# Patient Record
Sex: FEMALE
[De-identification: names, ages, dates, MRNs, and addresses within clinical notes are randomized per-mention and may not be internally consistent; named-entity substitution may affect disease eponyms.]

---

## 2021-07-15 ENCOUNTER — HOSPITAL ENCOUNTER (INPATIENT)
Dept: HOSPITAL 75 - ICU | Age: 81
LOS: 3 days | Discharge: HOME | DRG: 378 | End: 2021-07-18
Attending: INTERNAL MEDICINE | Admitting: INTERNAL MEDICINE
Payer: MEDICARE

## 2021-07-15 VITALS — WEIGHT: 156.75 LBS | HEIGHT: 65.75 IN | BODY MASS INDEX: 25.49 KG/M2

## 2021-07-15 DIAGNOSIS — Z79.01: ICD-10-CM

## 2021-07-15 DIAGNOSIS — J44.9: ICD-10-CM

## 2021-07-15 DIAGNOSIS — Z88.2: ICD-10-CM

## 2021-07-15 DIAGNOSIS — Z88.0: ICD-10-CM

## 2021-07-15 DIAGNOSIS — Z20.822: ICD-10-CM

## 2021-07-15 DIAGNOSIS — K92.1: Primary | ICD-10-CM

## 2021-07-15 DIAGNOSIS — K29.70: ICD-10-CM

## 2021-07-15 DIAGNOSIS — H54.7: ICD-10-CM

## 2021-07-15 DIAGNOSIS — I48.21: ICD-10-CM

## 2021-07-15 DIAGNOSIS — K44.9: ICD-10-CM

## 2021-07-15 DIAGNOSIS — K21.9: ICD-10-CM

## 2021-07-15 DIAGNOSIS — I10: ICD-10-CM

## 2021-07-15 PROCEDURE — 84100 ASSAY OF PHOSPHORUS: CPT

## 2021-07-15 PROCEDURE — 74177 CT ABD & PELVIS W/CONTRAST: CPT

## 2021-07-15 PROCEDURE — 87636 SARSCOV2 & INF A&B AMP PRB: CPT

## 2021-07-15 PROCEDURE — 87081 CULTURE SCREEN ONLY: CPT

## 2021-07-15 PROCEDURE — 85027 COMPLETE CBC AUTOMATED: CPT

## 2021-07-15 PROCEDURE — 36415 COLL VENOUS BLD VENIPUNCTURE: CPT

## 2021-07-15 PROCEDURE — 83735 ASSAY OF MAGNESIUM: CPT

## 2021-07-15 PROCEDURE — 94760 N-INVAS EAR/PLS OXIMETRY 1: CPT

## 2021-07-15 PROCEDURE — 94640 AIRWAY INHALATION TREATMENT: CPT

## 2021-07-15 PROCEDURE — 80076 HEPATIC FUNCTION PANEL: CPT

## 2021-07-15 PROCEDURE — 80048 BASIC METABOLIC PNL TOTAL CA: CPT

## 2021-07-15 PROCEDURE — 85025 COMPLETE CBC W/AUTO DIFF WBC: CPT

## 2021-07-15 RX ADMIN — SODIUM CHLORIDE SCH MLS/HR: 900 INJECTION, SOLUTION INTRAVENOUS at 23:50

## 2021-07-15 NOTE — TELE-ICU CONSULT
History of Present Illness


History of Present Illness


Date Seen by Provider:  Jul 15, 2021


Time Seen by Provider:  23:14


Date of Admission


80 yo F admitted from OSH. Has weakness, LGI bleed, BRB, taking Eliquis for a 

fib, Has COPD, surgery to see in am, Hb was 14, no hypotension


WBC 15k Hx of GI bleed 18 years ago, 


Hx of bleeding ulcer, needed 4U of blood


No associated pain but has been having diarrhea lately





Allergies and Home Medications


Allergies


Coded Allergies:  


     Penicillins (Verified  Allergy, Unknown, 7/15/21)


     Sulfa (Sulfonamide Antibiotics) (Verified  Allergy, Unknown, 7/15/21)


     Tetanus Vaccines and Toxoid (Verified  Allergy, Unknown, 7/15/21)


     acetaminophen (Verified  Allergy, Unknown, 7/15/21)


     cephalexin (Verified  Allergy, Unknown, 7/15/21)


     ibuprofen (Verified  Allergy, Unknown, 7/15/21)


     warfarin (Verified  Allergy, Unknown, 7/15/21)





Past Medical/Social/Family Hx


Patient Social History


Tobacco Use?:  No


Smoking Status:  Never a Smoker


Smokeless Tobacco Frequency:  Never a User


Use of E-Cig and/or Vaping dev:  No


Substance use?:  No


Alcohol Use?:  No


Pt stated abuse/neglect:  No





Immunizations Up To Date


Influenza Vaccine Up-to-Date:  No; Not Current





Current Status


Pregnancy status:  No


Advance Directives:  No


Communicates:  Verbally


Primary Language:  English


Preferred Spoken Language:  English


Is interpretation needed?:  No


Sensory deficits:  Vision impairment


Implanted or Applied Medical D:  Orthopedic hardware





Review of Systems


Gastrointestinal:  diarrhea





Sepsis Event


Evaluation


Height, Weight, BMI


Height: '"


Weight: lbs. oz. kg; 25.09 BMI


Method:





Exam


Exam


Patient acknowledged, consented, and participated in this virtual visit which 

was conducted using real time audio/video


Height & Weight


Height: '"


Weight: lbs. oz. kg; 25.09 BMI


Method:


Cardiovascular:  Regular Rate, Rhythm


Gastrointestinal:  normal bowel sounds, non tender, soft


Neurologic/Psychiatric:  Alert, Oriented x3





Assessment/Plan


Assessment/Plan


BPB per rectum, surgery to see re:colonoscopy, will monitor for further bleeding

and monitor Hb











GLENN FLORES MD             Jul 15, 2021 23:19

## 2021-07-16 LAB
BASOPHILS # BLD AUTO: 0.1 10^3/UL (ref 0–0.1)
BASOPHILS NFR BLD AUTO: 1 % (ref 0–10)
BUN/CREAT SERPL: 16
CALCIUM SERPL-MCNC: 7.7 MG/DL (ref 8.5–10.1)
CHLORIDE SERPL-SCNC: 103 MMOL/L (ref 98–107)
CO2 SERPL-SCNC: 21 MMOL/L (ref 21–32)
CREAT SERPL-MCNC: 0.69 MG/DL (ref 0.6–1.3)
EOSINOPHIL # BLD AUTO: 0.1 10^3/UL (ref 0–0.3)
EOSINOPHIL NFR BLD AUTO: 1 % (ref 0–10)
GFR SERPLBLD BASED ON 1.73 SQ M-ARVRAT: > 60 ML/MIN
GLUCOSE SERPL-MCNC: 96 MG/DL (ref 70–105)
HCT VFR BLD CALC: 42 % (ref 35–52)
HCT VFR BLD CALC: 46 % (ref 35–52)
HGB BLD-MCNC: 14.2 G/DL (ref 11.5–16)
HGB BLD-MCNC: 14.9 G/DL (ref 11.5–16)
LYMPHOCYTES # BLD AUTO: 2 10^3/UL (ref 1–4)
LYMPHOCYTES NFR BLD AUTO: 14 % (ref 12–44)
MAGNESIUM SERPL-MCNC: 1.6 MG/DL (ref 1.6–2.4)
MANUAL DIFFERENTIAL PERFORMED BLD QL: NO
MCH RBC QN AUTO: 31 PG (ref 25–34)
MCH RBC QN AUTO: 33 PG (ref 25–34)
MCHC RBC AUTO-ENTMCNC: 33 G/DL (ref 32–36)
MCHC RBC AUTO-ENTMCNC: 34 G/DL (ref 32–36)
MCV RBC AUTO: 96 FL (ref 80–99)
MCV RBC AUTO: 97 FL (ref 80–99)
MONOCYTES # BLD AUTO: 0.9 10^3/UL (ref 0–1)
MONOCYTES NFR BLD AUTO: 6 % (ref 0–12)
NEUTROPHILS # BLD AUTO: 11.5 10^3/UL (ref 1.8–7.8)
NEUTROPHILS NFR BLD AUTO: 79 % (ref 42–75)
PHOSPHATE SERPL-MCNC: 2.6 MG/DL (ref 2.3–4.7)
PLATELET # BLD: 210 10^3/UL (ref 130–400)
PLATELET # BLD: 229 10^3/UL (ref 130–400)
PMV BLD AUTO: 10.5 FL (ref 9–12.2)
PMV BLD AUTO: 10.8 FL (ref 9–12.2)
POTASSIUM SERPL-SCNC: 3.9 MMOL/L (ref 3.6–5)
SODIUM SERPL-SCNC: 134 MMOL/L (ref 135–145)
WBC # BLD AUTO: 13.4 10^3/UL (ref 4.3–11)
WBC # BLD AUTO: 14.5 10^3/UL (ref 4.3–11)

## 2021-07-16 RX ADMIN — SODIUM CHLORIDE SCH MLS/HR: 900 INJECTION, SOLUTION INTRAVENOUS at 06:23

## 2021-07-16 RX ADMIN — SODIUM CHLORIDE SCH MLS/HR: 900 INJECTION, SOLUTION INTRAVENOUS at 17:33

## 2021-07-16 RX ADMIN — PANTOPRAZOLE SODIUM SCH MG: 40 INJECTION, POWDER, FOR SOLUTION INTRAVENOUS at 08:57

## 2021-07-16 RX ADMIN — DIGOXIN SCH MG: 250 TABLET ORAL at 08:41

## 2021-07-16 RX ADMIN — SODIUM CHLORIDE SCH MLS/HR: 900 INJECTION, SOLUTION INTRAVENOUS at 08:41

## 2021-07-16 NOTE — TELE-ICU PROGRESS NOTE
Subjective


Date Seen by a Provider:  Jul 16, 2021


Time Seen by a Provider:  08:33


Subjective/Events-last exam


Patient with history of atrial fibrillation on anticoagulant therapy admitted 

with bright red blood per rectum.  Her hemoglobin is stable at the.  She 

continues to have bleeding per rectum but it is slowed down per her.  Patient 

has a mild cramps in the abdomen.  General surgeon was consulted for possible 

colonoscopy.  She denies any dizziness.  I have made a video visit and discussed

with the patient as well as the bedside RN.  There is no previous history of 

peptic ulcer disease or gastritis or any previous history of gastrointestinal 

bleeding.  No history of cirrhosis of the liver.





Sepsis Event


Evaluation


Height, Weight, BMI


Height: '"


Weight: lbs. oz. kg; 25.09 BMI


Method:





Exam


Exam


Patient acknowledged, consented, and participated in this virtual visit which 

was conducted using real time audio/video


Vital Signs








  Date Time  Temp Pulse Resp B/P (MAP) Pulse Ox O2 Delivery O2 Flow Rate FiO2


 


7/16/21 08:12     98 Room Air  


 


7/16/21 08:10 36.9       


 


7/16/21 08:00  87 19  98 Room Air  


 


7/16/21 07:00  93 19 149/93 (111) 97 Room Air  


 


7/16/21 07:00  113      


 


7/16/21 06:00  84 12 151/84 (106) 96 Room Air  


 


7/16/21 05:00  89 20 151/97 (115) 95 Room Air  


 


7/16/21 04:00      Room Air  


 


7/16/21 04:00  86 17 151/96 (114) 95 Room Air  


 


7/16/21 03:00  98 20 144/94 (111) 94 Room Air  


 


7/16/21 02:00  96 12 153/96 (115) 95 Room Air  


 


7/16/21 01:00  91      


 


7/16/21 01:00  91 18 138/83 (101) 93 Room Air  


 


7/16/21 00:41     98   21


 


7/16/21 00:00  109 12 149/112 (124) 95 Room Air  


 


7/16/21 00:00      Room Air  


 


7/15/21 23:45  101 10 152/94 (113) 96 Room Air  


 


7/15/21 23:30  108 20 172/96 (121) 92 Room Air  


 


7/15/21 23:15  96 12 177/119 (138) 96 Room Air  


 


7/15/21 23:00  102 12 170/115 (133) 93 Room Air  


 


7/15/21 22:54  123      


 


7/15/21 22:53 36.4 112 16 180/106 (130) 95 Room Air  














I & O 


 


 7/16/21





 07:00


 


Intake Total 0 ml


 


Output Total 600 ml


 


Balance -600 ml








Height & Weight


Height: '"


Weight: lbs. oz. kg; 25.09 BMI


Method:


General Appearance:  No Apparent Distress


Cardiovascular:  Regular Rate, Rhythm


Gastrointestinal:  normal bowel sounds, non tender, soft


Neurologic/Psychiatric:  Alert, Oriented x3


Other comments


ros per attending





Results


Lab


Laboratory Tests


7/16/21 03:35








7/16/21 05:58








Meds


reviewed





Assessment/Plan


Assessment/Plan


1.  Lower GI bleeding.


2.  History of chronic anticoagulation


3.  History of atrial fibrillation.





Recommendations


1.  We will start on IV Protonix in case patient has any component of upper GI 

source.


2.  We will repeat a CBC this a.m.


3.  General surgery consultation has been requested.


4.  Discussed with the patient and bedside RN


Time spent with patient (mins):  25











DILMA CENTENO MD                Jul 16, 2021 08:38

## 2021-07-16 NOTE — HISTORY & PHYSICAL-HOSPITALIST
JONNY LEMUS MED STUDENT 7/16/21 1111:


History of Present Illness


HPI/Chief Complaint


This is a 80 YO female with history of COPD, HTN, and Afib who came to the 

[place] ER for bloody BM's. Pt states yesterday morning when she was getting her 

hair done, she became dizzy and went to the bathroom, where she had non-bloody 

diarrhea. Later in the afternoon she had a BM that was gross blood with some 

clots in it. She later had another bloody BM and went to the [place] ER after 

that. Hbg there was stable at 14.4. Beds were full in [place], so she was transf

ered here. She is on Eliquis for her Afib. She had an episode of a bleeding 

ulcer about 15 years ago that required transfusion of 4 units of blood. Had a 

colonoscopy a few years ago that was negative. No history of hemorrhoids, but 

over the past couple of weeks, has had some epsodes of constipation and 

straining with BM's. No fever, chills, nausea, vomiting, or abdominal pain. Says

she had a BM this morning with blood in it.


Source:  patient, RN/MD, other ([place] ER records)


Exam Limitations:  no limitations


Date Seen


7/16/21


Time Seen by a Provider:  08:45


Attending Physician


Kit Alvarenga MD


PCP





Referring Physician





Date of Admission


Jul 15, 2021 at 22:47





Home Medications & Allergies


Home Medications


Reviewed patient Home Medication Reconciliation performed by pharmacy medication

reconciliations technician and/or nursing.


Patients Allergies have been reviewed.





Allergies





Allergies


Coded Allergies


  Penicillins (Verified Allergy, Unknown, 7/15/21)


  Sulfa (Sulfonamide Antibiotics) (Verified Allergy, Unknown, 7/15/21)


  Tetanus Vaccines and Toxoid (Verified Allergy, Unknown, 7/15/21)


  acetaminophen (Verified Allergy, Unknown, 7/15/21)


  cephalexin (Verified Allergy, Unknown, 7/15/21)


  ibuprofen (Verified Allergy, Unknown, 7/15/21)


  warfarin (Verified Allergy, Unknown, 7/15/21)








Past Medical-Social-Family Hx


Patient Social History


Tobacco Use?:  No


Smoking Status:  Never a Smoker


Smokeless Tobacco Frequency:  Never a User


Use of E-Cig and/or Vaping dev:  No


Substance use?:  No


Alcohol Use?:  No


Pt feels they are or have been:  No





Current Status


Pregnancy status:  No


Advance Directives:  No


Communicates:  Verbally


Primary Language:  English


Preferred Spoken Language:  English


Is interpretation needed?:  No


Sensory deficits:  Vision impairment


Implanted or Applied Medical D:  Orthopedic hardware





Past Medical History


COPD


Atrial Fibrillation, Hypertension





Review of Systems


Constitutional:  No chills, No fever


EENTM:  no symptoms reported


Respiratory:  No cough, No dyspnea on exertion


Cardiovascular:  No chest pain


Gastrointestinal:  diarrhea; No vomiting


Genitourinary:  no symptoms reported


Musculoskeletal:  no symptoms reported


Skin:  no symptoms reported


Psychiatric/Neurological:  No Symptoms Reported


All Other Systems Reviewed


Negative Unless Noted:  Yes (Negative excepted noted.)





Physical Exam


Physical Exam


Vital Signs





Vital Signs - First Documented








 7/15/21 7/16/21





 22:53 00:41


 


Temp 36.4 


 


Pulse 112 


 


Resp 16 


 


B/P (MAP) 180/106 (130) 


 


Pulse Ox 95 


 


O2 Delivery Room Air 


 


FiO2  21





Capillary Refill :


Height, Weight, BMI


Height: '"


Weight: lbs. oz. kg; 25.09 BMI


Method:


General Appearance:  No Apparent Distress, WD/WN


Respiratory:  Lungs Clear, Normal Breath Sounds, No Accessory Muscle Use, No 

Respiratory Distress


Cardiovascular:  No Murmur, Irregularly Irregular, Other (borderline 

tachycardic)


Gastrointestinal:  Non Tender, Soft


Extremity:  Non Tender, No Pedal Edema


Neurologic/Psychiatric:  Alert, Oriented x3, No Motor/Sensory Deficits, Normal 

Mood/Affect


Skin:  Normal Color, Warm/Dry





Results


Results/Procedures


Labs


Laboratory Tests


7/16/21 03:35








7/16/21 05:58








7/16/21 11:25








Patient resulted labs reviewed.





Assessment/Plan


Admission Diagnosis


GI bleed


Admission Status:  Inpatient Order (span 2 midnights)





Assessment and Plan


GI bleed


   consult Dr. Rubalcava


   hgb stable, continue to monitor


Atrial fibrillation


   hold Eliquis


   continue home cardiazem and digoxin


COPD


HTN





Protonix for GI prophylaxis





BARRY MYRICK MD 7/16/21 1150:


Past Medical-Social-Family Hx


Family Medical History


No Pertinent Family Hx





Assessment/Plan


Admission Diagnosis


Admission Status:  Inpatient Order (span 2 midnights)


Reason for Inpatient Admission:  


GI bleed requiring endoscopic evaluation





Assessment and Plan


New onset bright red blood per rectum, ongoing since admission. Hemoglobin 

within normal limits, no indication for transfusion at this time. Continue to 

monitor. Surgery consulted, await plan regarding endoscopic evaluation.





Diagnosis/Problems


Diagnosis/Problems





(1) BRBPR (bright red blood per rectum)


Status:  Acute


(2) Afib


Status:  Chronic


Qualifiers:  


   Atrial fibrillation type:  permanent  Qualified Codes:  I48.21 - Permanent 

atrial fibrillation


(3) HTN (hypertension)


Status:  Chronic


Qualifiers:  


   Hypertension type:  essential hypertension  Qualified Codes:  I10 - Essential

(primary) hypertension


(4) COPD (chronic obstructive pulmonary disease)


Status:  Chronic


Qualifiers:  


   COPD type:  chronic bronchitis  Chronic bronchitis type:  unspecified  

Qualified Codes:  J42 - Unspecified chronic bronchitis





Supervisory-Addendum Brief


Verification & Attestation


Participated in pt care:  history, MDM, physical


Personally performed:  exam, history, MDM, supervision of care


Care discussed with:  Medical Student


Procedures:  n/a


Results interpretation:  Verified all documentation


A medical student performed and documented this service. I then reviewed and 

verified all information documented by the medical student and made 

modifications to such information, when appropriate. I personally performed a 

history, physical exam, and performed medical decision making.











JONNY LEMUS MED STUDENT     Jul 16, 2021 11:11


BARRY MYRICK MD              Jul 16, 2021 11:50
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
## 2021-07-17 LAB
BASOPHILS # BLD AUTO: 0.1 10^3/UL (ref 0–0.1)
BASOPHILS NFR BLD AUTO: 0 % (ref 0–10)
BUN/CREAT SERPL: 15
CALCIUM SERPL-MCNC: 7.9 MG/DL (ref 8.5–10.1)
CHLORIDE SERPL-SCNC: 106 MMOL/L (ref 98–107)
CO2 SERPL-SCNC: 20 MMOL/L (ref 21–32)
CREAT SERPL-MCNC: 0.67 MG/DL (ref 0.6–1.3)
EOSINOPHIL # BLD AUTO: 0.3 10^3/UL (ref 0–0.3)
EOSINOPHIL NFR BLD AUTO: 2 % (ref 0–10)
GFR SERPLBLD BASED ON 1.73 SQ M-ARVRAT: > 60 ML/MIN
GLUCOSE SERPL-MCNC: 90 MG/DL (ref 70–105)
HCT VFR BLD CALC: 45 % (ref 35–52)
HGB BLD-MCNC: 14.8 G/DL (ref 11.5–16)
LYMPHOCYTES # BLD AUTO: 2.3 10^3/UL (ref 1–4)
LYMPHOCYTES NFR BLD AUTO: 17 % (ref 12–44)
MAGNESIUM SERPL-MCNC: 1.6 MG/DL (ref 1.6–2.4)
MANUAL DIFFERENTIAL PERFORMED BLD QL: NO
MCH RBC QN AUTO: 32 PG (ref 25–34)
MCHC RBC AUTO-ENTMCNC: 33 G/DL (ref 32–36)
MCV RBC AUTO: 96 FL (ref 80–99)
MONOCYTES # BLD AUTO: 1.1 10^3/UL (ref 0–1)
MONOCYTES NFR BLD AUTO: 8 % (ref 0–12)
NEUTROPHILS # BLD AUTO: 9.5 10^3/UL (ref 1.8–7.8)
NEUTROPHILS NFR BLD AUTO: 72 % (ref 42–75)
PHOSPHATE SERPL-MCNC: 2.1 MG/DL (ref 2.3–4.7)
PLATELET # BLD: 258 10^3/UL (ref 130–400)
PMV BLD AUTO: 10.9 FL (ref 9–12.2)
POTASSIUM SERPL-SCNC: 4 MMOL/L (ref 3.6–5)
SODIUM SERPL-SCNC: 138 MMOL/L (ref 135–145)
WBC # BLD AUTO: 13.2 10^3/UL (ref 4.3–11)

## 2021-07-17 RX ADMIN — BUDESONIDE SCH MG: 0.5 SUSPENSION RESPIRATORY (INHALATION) at 07:42

## 2021-07-17 RX ADMIN — SODIUM CHLORIDE SCH MLS/HR: 900 INJECTION, SOLUTION INTRAVENOUS at 22:22

## 2021-07-17 RX ADMIN — SODIUM CHLORIDE SCH MLS/HR: 900 INJECTION, SOLUTION INTRAVENOUS at 05:34

## 2021-07-17 RX ADMIN — SODIUM CHLORIDE SCH MLS/HR: 900 INJECTION, SOLUTION INTRAVENOUS at 23:25

## 2021-07-17 RX ADMIN — MAGNESIUM SULFATE IN DEXTROSE SCH MLS/HR: 10 INJECTION, SOLUTION INTRAVENOUS at 08:17

## 2021-07-17 RX ADMIN — DIGOXIN SCH MG: 250 TABLET ORAL at 08:18

## 2021-07-17 RX ADMIN — MAGNESIUM SULFATE IN DEXTROSE SCH MLS/HR: 10 INJECTION, SOLUTION INTRAVENOUS at 10:41

## 2021-07-17 RX ADMIN — PANTOPRAZOLE SODIUM SCH MG: 40 INJECTION, POWDER, FOR SOLUTION INTRAVENOUS at 08:17

## 2021-07-17 NOTE — PROGRESS NOTE - SURGERY
ARSENIO SANCHEZ MED STUDENT 7/17/21 0926:


Subjective


Date Seen by a Provider:  Jul 17, 2021


Time Seen by a Provider:  08:20


Subjective/Events-last exam


Patient is 81 year old female being followed for bloody diarrhea. Today she 

reports she is feeling "fine". She reports no pain, nause, vomitting, changes in

urinary habits. She states her last bm "yesterday" did not have visible blood. 

She did notice a small amount of bloody drainage this morning.





Objective


Exam





Vital Signs








  Date Time  Temp Pulse Resp B/P (MAP) Pulse Ox O2 Delivery O2 Flow Rate FiO2


 


7/17/21 07:25 37.2 70 22 120/76 (91) 96 Room Air  


 


7/17/21 04:37 36.4 83 18 139/70 (93) 95 Room Air  


 


7/17/21 00:00 36.8    95 Room Air  


 


7/16/21 23:15  98 25 148/73 (98)  Room Air  


 


7/16/21 21:00     96 Room Air  


 


7/16/21 19:34 37.0 74 18 143/79 (100) 94 Room Air  


 


7/16/21 19:00  84      


 


7/16/21 17:00  77 17 152/81 (104) 98 Room Air  


 


7/16/21 16:20     97 Room Air  


 


7/16/21 16:00  73 17  97 Room Air  


 


7/16/21 15:51 36.8       


 


7/16/21 15:00  79 18 129/74 (92) 97 Room Air  


 


7/16/21 14:00  65 27 138/78 (98) 92 Room Air  


 


7/16/21 13:00  101 20 141/86 (104) 91 Room Air  


 


7/16/21 13:00  78      


 


7/16/21 12:00     95 Room Air  


 


7/16/21 12:00  76 17 141/77 (98) 95 Room Air  


 


7/16/21 11:44 36.4       


 


7/16/21 11:00  93 15 147/84 (105) 91 Room Air  


 


7/16/21 10:00  92 19 146/103 (117) 95 Room Air  














I & O 


 


 7/17/21





 07:00


 


Intake Total 3025 ml


 


Output Total 1550 ml


 


Balance 1475 ml





Capillary Refill :


General Appearance:  No Apparent Distress, WD/WN


Respiratory:  Lungs Clear, Normal Breath Sounds, No Accessory Muscle Use, No 

Respiratory Distress


Cardiovascular:  No Murmur, Irregularly Irregular


Peripheral Pulses:  2+ Radial Pulses (R), 2+ Radial Pulses (L)


Gastrointestinal:  normal bowel sounds, non tender, soft, no organomegaly, no 

pulsatile mass


Extremity:  Non Tender, No Pedal Edema


Neurologic/Psychiatric:  Alert, Oriented x3, No Motor/Sensory Deficits, Normal 

Mood/Affect


Skin:  Normal Color, Warm/Dry





Results


Lab


Laboratory Tests


7/16/21 11:25: 


White Blood Count 13.4H, Red Blood Count 4.37, Hemoglobin 14.2, Hematocrit 42, 

Mean Corpuscular Volume 97, Mean Corpuscular Hemoglobin 33, Mean Corpuscular 

Hemoglobin Concent 34, Red Cell Distribution Width 12.9, Platelet Count 229, 

Mean Platelet Volume 10.5


7/17/21 06:35: 


White Blood Count 13.2H, Red Blood Count 4.66, Hemoglobin 14.8, Hematocrit 45, 

Mean Corpuscular Volume 96, Mean Corpuscular Hemoglobin 32, Mean Corpuscular 

Hemoglobin Concent 33, Red Cell Distribution Width 12.9, Platelet Count 258, 

Mean Platelet Volume 10.9, Immature Granulocyte % (Auto) 0, Neutrophils (%) 

(Auto) 72, Lymphocytes (%) (Auto) 17, Monocytes (%) (Auto) 8, Eosinophils (%) 

(Auto) 2, Basophils (%) (Auto) 0, Neutrophils # (Auto) 9.5H, Lymphocytes # 

(Auto) 2.3, Monocytes # (Auto) 1.1H, Eosinophils # (Auto) 0.3, Basophils # 

(Auto) 0.1, Immature Granulocyte # (Auto) 0.0, Sodium Level 138, Potassium Level

4.0, Chloride Level 106, Carbon Dioxide Level 20L, Anion Gap 12, Blood Urea 

Nitrogen 10, Creatinine 0.67, Estimat Glomerular Filtration Rate > 60, BUN/C

reatinine Ratio 15, Glucose Level 90, Calcium Level 7.9L, Phosphorus Level 2.1L,

Magnesium Level 1.6





Assessment/Plan


Rectal bleeding


diarrhea


a-fib


COPD





Hold eliquis


full diet tonight, clear liquid after midnight


GI Prophylaxis


Continue home meds


repeat CBC/CMP am





Patients hemoglobin has been stable (14.9, 14.2, 14.8 today), will recheck 

tomorrow, patient expressed desire for upper and lower endoscopy during this 

hospital stay, if possible start prep and plan for tomorrow, if not schedule out

patient procedure.





KEN MIRELES DO 7/17/21 1315:


Subjective


Time Seen by a Provider:  10:11


Subjective/Events-last exam


Pt seen and examined, states no changes today and denies abdominal pain.  She 

wants to have the endoscopies while she is in the hospital because she is 

worried about time delay if she goes home and tries to schedule them.


Review of Systems


General:  No Chills, No Night Sweats


Pulmonary:  No Dyspnea, No Cough


Cardiovascular:  No: Chest Pain, Palpitations


Gastrointestinal:  No: Nausea, Vomiting, Abdominal Pain





Objective


Exam


General Appearance:  No Apparent Distress, WD/WN


Respiratory:  Lungs Clear, Normal Breath Sounds, No Accessory Muscle Use, No 

Respiratory Distress


Cardiovascular:  No Murmur, Irregularly Irregular


Gastrointestinal:  normal bowel sounds, non tender, soft, no organomegaly, no 

pulsatile mass


Extremity:  Non Tender, No Pedal Edema


Neurologic/Psychiatric:  Alert, Oriented x3


Skin:  Normal Color, Warm/Dry





Assessment/Plan


Rectal bleeding


Diarrhea


Atrial Fibrillation


COPD





Eliquis is being held and will start colonoscopy prep today, NPO after midnight 

and will get consent for EGD and colonoscopy.  Went over risks and complications

not limited to pain, bleeding, infection and even intestinal perforation.  The 

benefit


is to hopefully determine cause of rectal bleed.  GI Prophylaxis, Continue home 

meds.  No need to repeat labs at this time.





Supervisory-Addendum Brief


Verification & Attestation


Participated in pt care:  history, MDM, physical


Personally performed:  exam, history, MDM, supervision of care


Care discussed with:  Medical Student


Procedures:  n/a


Verification and Attestation of Medical Student E/M Service





A medical student performed and documented this service. I then reviewed and 

verified all information documented by the medical student and made 

modifications to such information, when appropriate. I personally performed a 

physical exam, medical decision making and then discussed any differences 

between the notes and made revisions as necessary to create one note.





Ken Mireles , 7/17/21 , 13:15











ARSENIO SANCHEZ MED STUDENT  Jul 17, 2021 09:26


KEN MIRELES DO               Jul 17, 2021 13:15

## 2021-07-17 NOTE — PROGRESS NOTE - HOSPITALIST
Subjective


HPI/CC On Admission


Date Seen by Provider:  Jul 17, 2021


Time Seen by Provider:  10:35





Subjective/Events-last exam


She is feeling better today.  She had a bowel movement with no blood.  She would

like to proceed with endoscopies.





Objective


Exam


Vital Signs





Vital Signs








  Date Time  Temp Pulse Resp B/P (MAP) Pulse Ox O2 Delivery O2 Flow Rate FiO2


 


7/17/21 11:36 36.7 77 20 158/91 (113) 93 Room Air  


 


7/16/21 00:41        21





Capillary Refill :


General Appearance:  No Apparent Distress, WD/WN


Respiratory:  Lungs Clear, Normal Breath Sounds, No Respiratory Distress


Cardiovascular:  Regular Rate, Rhythm, No Edema, No Murmur


Gastrointestinal:  Normal Bowel Sounds, Non Tender, Soft


Extremity:  Normal Inspection, Non Tender, No Pedal Edema


Neurologic/Psychiatric:  Alert, Oriented x3, No Motor/Sensory Deficits, Normal 

Mood/Affect


Skin:  Normal Color, Warm/Dry





Results/Procedures


Lab


Laboratory Tests


7/17/21 06:35








Patient resulted labs reviewed.





Assessment/Plan


Assessment and Plan


Assess & Plan/Chief Complaint


Hematochezia


   Hemoglobin normal, stable


   Now resolved


   Surgery consulted, appreciate assistance


   Planning for EGD/colonoscopy tomorrow





Permanent atrial fibrillation


   Continue digoxin and diltiazem


   Hold Eliquis





Hypertension


COPD


   Continue home meds





DVT prophylaxis: Held due to GI bleeding





Diagnosis/Problems


Diagnosis/Problems





(1) BRBPR (bright red blood per rectum)


Status:  Acute


(2) Afib


Status:  Chronic


Qualifiers:  


   Atrial fibrillation type:  permanent  Qualified Codes:  I48.21 - Permanent 

atrial fibrillation


(3) HTN (hypertension)


Status:  Chronic


Qualifiers:  


   Hypertension type:  essential hypertension  Qualified Codes:  I10 - Essential

(primary) hypertension


(4) COPD (chronic obstructive pulmonary disease)


Status:  Chronic


Qualifiers:  


   COPD type:  chronic bronchitis  Chronic bronchitis type:  unspecified  

Qualified Codes:  J42 - Unspecified chronic bronchitis











BARRY MYRICK MD              Jul 17, 2021 13:00

## 2021-07-18 VITALS — DIASTOLIC BLOOD PRESSURE: 59 MMHG | SYSTOLIC BLOOD PRESSURE: 125 MMHG

## 2021-07-18 VITALS — DIASTOLIC BLOOD PRESSURE: 52 MMHG | SYSTOLIC BLOOD PRESSURE: 100 MMHG

## 2021-07-18 VITALS — DIASTOLIC BLOOD PRESSURE: 76 MMHG | SYSTOLIC BLOOD PRESSURE: 136 MMHG

## 2021-07-18 VITALS — SYSTOLIC BLOOD PRESSURE: 100 MMHG | DIASTOLIC BLOOD PRESSURE: 55 MMHG

## 2021-07-18 LAB
ALBUMIN SERPL-MCNC: 2.7 GM/DL (ref 3.2–4.5)
ALP SERPL-CCNC: 71 U/L (ref 40–136)
ALT SERPL-CCNC: 12 U/L (ref 0–55)
BASOPHILS # BLD AUTO: 0.1 10^3/UL (ref 0–0.1)
BASOPHILS NFR BLD AUTO: 1 % (ref 0–10)
BILIRUB DIRECT SERPL-MCNC: 0.4 MG/DL (ref 0–0.3)
BILIRUB SERPL-MCNC: 0.9 MG/DL (ref 0.1–1)
BUN/CREAT SERPL: 8
CALCIUM SERPL-MCNC: 7.7 MG/DL (ref 8.5–10.1)
CHLORIDE SERPL-SCNC: 109 MMOL/L (ref 98–107)
CO2 SERPL-SCNC: 22 MMOL/L (ref 21–32)
CREAT SERPL-MCNC: 0.62 MG/DL (ref 0.6–1.3)
EOSINOPHIL # BLD AUTO: 0.5 10^3/UL (ref 0–0.3)
EOSINOPHIL NFR BLD AUTO: 5 % (ref 0–10)
GFR SERPLBLD BASED ON 1.73 SQ M-ARVRAT: > 60 ML/MIN
GLUCOSE SERPL-MCNC: 93 MG/DL (ref 70–105)
HCT VFR BLD CALC: 42 % (ref 35–52)
HGB BLD-MCNC: 13.8 G/DL (ref 11.5–16)
LYMPHOCYTES # BLD AUTO: 1.8 10^3/UL (ref 1–4)
LYMPHOCYTES NFR BLD AUTO: 19 % (ref 12–44)
MAGNESIUM SERPL-MCNC: 1.8 MG/DL (ref 1.6–2.4)
MANUAL DIFFERENTIAL PERFORMED BLD QL: NO
MCH RBC QN AUTO: 31 PG (ref 25–34)
MCHC RBC AUTO-ENTMCNC: 33 G/DL (ref 32–36)
MCV RBC AUTO: 95 FL (ref 80–99)
MONOCYTES # BLD AUTO: 0.8 10^3/UL (ref 0–1)
MONOCYTES NFR BLD AUTO: 9 % (ref 0–12)
NEUTROPHILS # BLD AUTO: 6.2 10^3/UL (ref 1.8–7.8)
NEUTROPHILS NFR BLD AUTO: 66 % (ref 42–75)
PHOSPHATE SERPL-MCNC: 2.5 MG/DL (ref 2.3–4.7)
PLATELET # BLD: 253 10^3/UL (ref 130–400)
PMV BLD AUTO: 11.1 FL (ref 9–12.2)
POTASSIUM SERPL-SCNC: 3.6 MMOL/L (ref 3.6–5)
PROT SERPL-MCNC: 5.4 GM/DL (ref 6.4–8.2)
SODIUM SERPL-SCNC: 139 MMOL/L (ref 135–145)
WBC # BLD AUTO: 9.4 10^3/UL (ref 4.3–11)

## 2021-07-18 PROCEDURE — 0DB48ZX EXCISION OF ESOPHAGOGASTRIC JUNCTION, VIA NATURAL OR ARTIFICIAL OPENING ENDOSCOPIC, DIAGNOSTIC: ICD-10-PCS | Performed by: SURGERY

## 2021-07-18 PROCEDURE — 0DB78ZX EXCISION OF STOMACH, PYLORUS, VIA NATURAL OR ARTIFICIAL OPENING ENDOSCOPIC, DIAGNOSTIC: ICD-10-PCS | Performed by: SURGERY

## 2021-07-18 RX ADMIN — PANTOPRAZOLE SODIUM SCH MG: 40 INJECTION, POWDER, FOR SOLUTION INTRAVENOUS at 12:31

## 2021-07-18 RX ADMIN — BUDESONIDE SCH MG: 0.5 SUSPENSION RESPIRATORY (INHALATION) at 08:41

## 2021-07-18 NOTE — DIAGNOSTIC IMAGING REPORT
TECHNIQUE: All CT scans use one or more of the following dose

optimizing techniques: automated exposure control, MA and/or KvP

adjustment based on patient size and exam type or iterative

reconstruction.



INDICATION: Incomplete colonoscopy.



FINDINGS: CT scanning was obtained with Gastrografin enema.

Patient could only tolerate approximately 700 mL. Patient was

scanned in the supine and prone positions.



There is a minimal right pleural effusion. A large paraesophageal

hernia is incompletely imaged. Almost all of the stomach is above

the diaphragm. Duodenal sweep appears normal. Some gallstones are

present. The liver, spleen, pancreas, adrenal glands and kidneys

are normal. The uterus appears irregular with some cystic changes

within the uterus. Ultrasound could be helpful for further

evaluation. Colonic diverticuli are present in the sigmoid colon

with some narrowing of the colon in this region. No mass is seen.

There is some free fluid in the right paracolic gutter extending

into the pelvis. Vascular calcifications are present. The aorta

is tortuous. No aneurysm is present.



IMPRESSION: 

1. 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

stomach above the diaphragm.

2. Cholelithiasis.

3. There is some free fluid in the right side of the abdomen

extending into the pelvis.

4. The uterus is markedly abnormal with lobulation and multiple

cystic areas. 

5. Conic diverticula are present. There is narrowing of the

sigmoid colon.



Dictated by: 



  Dictated on workstation # [id number]

## 2021-07-18 NOTE — PROGRESS NOTE - HOSPITALIST
Subjective


HPI/CC On Admission


Date Seen by Provider:  Jul 18, 2021








Objective


Exam


Vital Signs





Vital Signs








  Date Time  Temp Pulse Resp B/P (MAP) Pulse Ox O2 Delivery O2 Flow Rate FiO2


 


7/18/21 17:26        


 


7/18/21 15:27     95 Room Air  


 


7/18/21 12:37 35.7 85 20     


 


7/18/21 11:50       10 


 


7/16/21 00:41        21





Capillary Refill :





Results/Procedures


Lab


Laboratory Tests


7/18/21 05:44








Patient resulted labs reviewed.











JEB CASTRO DO                Jul 18, 2021 06:54

## 2021-07-18 NOTE — PROGRESS NOTE-POST OPERATIVE
Post-Operative Progess Note


Surgeon (s)/Assistant (s)


Surgeon


АНДРЕЙ NEVAREZ DO


Assistant:  COLT Clark





Pre-Operative Diagnosis


Rectal bleed





Post-Operative Diagnosis





Gastritis


Hiatal hernia


Interhnal hemorrhoids


Raw anal and perineal area





Procedure & Operative Findings


Date of Procedure


7/18/21


Procedure Performed/Findings


PROCEDURE NOTE:


After informed consent was obtained, the patient was brought to the endoscopy


suite, placed in bed in left lateral decubitus position.  She was administered


IV sedation by the CRNA who then monitored  vitals the entire time, heart


rate, blood pressure and pulse ox and the scope was inserted down the mouth


through the esophagus into the stomach.  On the way down, noted some mild


esophagitis, took a picture, pushed into the stomach, pushed past the antrum


into the duodenum.  Duodenum looked good.  Pulled back and did a biopsy of


antrum, then retroflexed the scope, saw hiatal hernia, took a picture of this


and then pulled the scope into the GE junction, took another picture of the


hiatal hernia and then did a biopsy of the GE junction.  Pushed the scope back


into the stomach, suctioned all the air out of the stomach. At this point pulled

the 


scope up the esophagus and out the mouth. 





Switched camera, switched gloves, went down below, started the colonoscopy. 


Only able to push about 20-30cm in and then could not get the bowel to distend. 

I


insufflated with air, tried flushing with water and even moved her onto her 

back. l


still could not get past here.  I retroflexed in the rectal vault, saw some


minimal internal hemorrhoids and took a picture of this.  Also noted some 

possible


colitis, but this looked more like prep artifact.  I will order a CT with 

contrast enema


to try and look at colon.  I did not see anything in colon to explain rectal 

bleed.  


However, her anal area, perineal area antd into the labia looked raw and pale. 

Maybe


slightly excoriated.  The patient tolerated the procedure and she recovered in 

the endoscopy suite.


Anesthesia Type


IV sedation by CRNA





Estimated Blood Loss


Estimated blood loss (mL):  scant





Specimens/Packing


Specimens Removed


antral bx


GE jxn bx











АНДРЕЙ NEVAREZ DO               Jul 18, 2021 12:05

## 2021-07-18 NOTE — ANESTHESIA-GENERAL POST-OP
MAC


Patient Condition


Mental Status/LOC:  Same as Preop


Cardiovascular:  Satisfactory


Nausea/Vomiting:  Absent


Respiratory:  Satisfactory


Pain:  Controlled


Complications:  Absent





Post Op Complications


Complications


None





Follow Up Care/Instructions


Patient Instructions


None needed.





Anesthesiology Discharge Order


Discharge Order


Patient is doing well, no complaints, stable vital signs, no apparent adverse 

anesthesia problems.   


No complications reported per nursing.











VERNON GRIFFITH CRNA            Jul 18, 2021 13:09

## 2021-07-18 NOTE — PROGRESS NOTE - SURGERY
ARSENIO SANCHEZ MED STUDENT 7/18/21 0919:


Subjective


Date Seen by a Provider:  Jul 18, 2021


Time Seen by a Provider:  07:40


Subjective/Events-last exam


Patient is 81 year old female being followed for bloody diarrhea. Her only 

complaint this morning is she had a headache overnight and had difficulty 

sleeping. She has completed her prep and has been only having sips of water to 

keep her mouth from being "too dry". She does report some crampy abdominal pain,

that has been associated with bowel movements, she reports no blood in stools.


Review of Systems


General:  No Chills, No Night Sweats


HEENT:  Head Aches; No Visual Changes


Pulmonary:  No Dyspnea, No Cough


Cardiovascular:  No: Chest Pain, Palpitations


Gastrointestinal:  Diarrhea; No: Nausea, Vomiting


Genitourinary:  No Dysuria, No Frequency


Musculoskeletal:  other (baseline arthralgias)


Neurological:  No: Weakness, Numbness





Objective


Exam





Vital Signs








  Date Time  Temp Pulse Resp B/P (MAP) Pulse Ox O2 Delivery O2 Flow Rate FiO2


 


7/18/21 08:41     95 Room Air  


 


7/18/21 07:33 36.5 89 20 134/82 (99) 96 Room Air  


 


7/18/21 04:51 36.3 90 18 143/66 (91) 94 Room Air  


 


7/18/21 00:01 36.3 90 18 143/66 (91) 94 Room Air  


 


7/17/21 20:26     96 Room Air  


 


7/17/21 19:37 35.6 85 20 137/84 (101) 96 Room Air  


 


7/17/21 15:22 36.8 75 18 149/82 (104) 95 Room Air  


 


7/17/21 11:36 36.7 77 20 158/91 (113) 93 Room Air  














I & O 


 


 7/18/21





 07:00


 


Intake Total 2750 ml


 


Output Total 1750 ml


 


Balance 1000 ml





Capillary Refill :


General Appearance:  No Apparent Distress, WD/WN


Respiratory:  Lungs Clear, Normal Breath Sounds, No Accessory Muscle Use, No 

Respiratory Distress


Cardiovascular:  No Murmur, Irregularly Irregular


Peripheral Pulses:  2+ Radial Pulses (R), 2+ Radial Pulses (L)


Gastrointestinal:  non tender, soft, no organomegaly, no pulsatile mass, abnorma

l bowel sounds (hyperactive)


Extremity:  Non Tender, No Pedal Edema


Neurologic/Psychiatric:  Alert, Oriented x3


Skin:  Normal Color, Warm/Dry





Results


Lab


Laboratory Tests


7/17/21 23:20: 


Influenza Type A (RT-PCR) Not Detected, Influenza Type B (RT-PCR) Not Detected, 

SARS-CoV-2 RNA (RT-PCR) Not Detected


7/18/21 05:44: 


White Blood Count 9.4, Red Blood Count 4.39, Hemoglobin 13.8, Hematocrit 42, 

Mean Corpuscular Volume 95, Mean Corpuscular Hemoglobin 31, Mean Corpuscular 

Hemoglobin Concent 33, Red Cell Distribution Width 12.9, Platelet Count 253, 

Mean Platelet Volume 11.1, Immature Granulocyte % (Auto) 0, Neutrophils (%) 

(Auto) 66, Lymphocytes (%) (Auto) 19, Monocytes (%) (Auto) 9, Eosinophils (%) 

(Auto) 5, Basophils (%) (Auto) 1, Neutrophils # (Auto) 6.2, Lymphocytes # (Auto)

1.8, Monocytes # (Auto) 0.8, Eosinophils # (Auto) 0.5H, Basophils # (Auto) 0.1, 

Immature Granulocyte # (Auto) 0.0, Sodium Level 139, Potassium Level 3.6, 

Chloride Level 109H, Carbon Dioxide Level 22, Anion Gap 8, Blood Urea Nitrogen 

5L, Creatinine 0.62, Estimat Glomerular Filtration Rate > 60, BUN/Creatinine 

Ratio 8, Glucose Level 93, Calcium Level 7.7L, Phosphorus Level 2.5, Magnesium 

Level 1.8, Total Bilirubin 0.9, Direct Bilirubin 0.4H, Indirect Bilirubin 0.5, 

Aspartate Amino Transf (AST/SGOT) 18, Alanine Aminotransferase (ALT/SGPT) 12, 

Alkaline Phosphatase 71, Total Protein 5.4L, Albumin 2.7L





Assessment/Plan


Rectal bleeding


Diarrhea


Atrial Fibrillation


COPD





Eliquis is being held patient will have EGD on colonoscopy done today. Went over

risks and complications not limited to pain, bleeding, infection and even 

intestinal perforation.  The benefit


is to hopefully determine cause of rectal bleed.  GI Prophylaxis, Continue home 

meds.  Plan to discharge today after procedure.





KEN MIRELES DO 7/18/21 1051:


Subjective


Time Seen by a Provider:  10:39


Subjective/Events-last exam


Pt seen and examined, states she is having clear BM's. She denies abdominal pain

and is ready for endoscopies.


Review of Systems


General:  No Chills, No Night Sweats


HEENT:  Head Aches; No Visual Changes


Pulmonary:  No Dyspnea, No Cough


Cardiovascular:  No: Chest Pain, Palpitations


Gastrointestinal:  Diarrhea; No: Nausea, Vomiting





Objective


Exam


General Appearance:  No Apparent Distress, WD/WN


Respiratory:  Lungs Clear, Normal Breath Sounds, No Accessory Muscle Use, No 

Respiratory Distress


Cardiovascular:  No Murmur, Irregularly Irregular


Gastrointestinal:  non tender, soft, no organomegaly, no pulsatile mass


Extremity:  Non Tender, No Pedal Edema


Neurologic/Psychiatric:  Alert, Oriented x3





Assessment/Plan


Rectal bleeding


Diarrhea


Atrial Fibrillation


COPD





Eliquis was held; patient scheduled for EGD and colonoscopy today. Went over 

risks and complications not limited to pain, bleeding, infection and even 

intestinal perforation.  The benefit


is to hopefully determine cause of rectal bleed.  GI Prophylaxis, Continue home 

meds.  Plan to discharge today after procedure and can eat regular diet after 

procedure.





Supervisory-Addendum Brief


Verification & Attestation


Participated in pt care:  history, MDM, physical


Personally performed:  exam, history, MDM, supervision of care


Care discussed with:  Medical Student


Procedures:  n/a


Verification and Attestation of Medical Student E/M Service





A medical student performed and documented this service. I then reviewed and 

verified all information documented by the medical student and made 

modifications to such information, when appropriate. I personally performed a 

physical exam, medical decision making and then discussed any differences 

between the notes and made revisions as necessary to create one note.





Ken Mireles , 7/18/21 , 10:51











ARSENIO SANCHEZ MED STUDENT  Jul 18, 2021 09:19


KEN MIRELES DO               Jul 18, 2021 10:51

## 2021-07-18 NOTE — DISCHARGE SUMMARY
Discharge Summary


Hospital Course


Was the Problem List Reviewed?:  Yes


Problems/Dx:  


(1) BRBPR (bright red blood per rectum)


Status:  Acute


(2) Afib


Status:  Chronic


Qualifiers:  


   Qualified Codes:  I48.21 - Permanent atrial fibrillation


(3) HTN (hypertension)


Status:  Chronic


Qualifiers:  


   Qualified Codes:  I10 - Essential (primary) hypertension


(4) COPD (chronic obstructive pulmonary disease)


Status:  Chronic


Qualifiers:  


   Qualified Codes:  J42 - Unspecified chronic bronchitis


Hospital Course


Date of Admission: Jul 15, 2021 at 22:47 


Admission Diagnosis :  





Family Physician/Provider:   





Date of Discharge: 7/18/21 


Discharge Diagnosis: Rectal bleeding with negative scopes, wheezing requiring 

nebulizer treatments, chronic atrial fibrillation


Anticoagulation








Hospital Course:


Patient had a brief hospital course she was admitted for rectal bleeding 

anticoagulation was held Dr. Mireles performed EGD and colonoscopy revealing no 

source of bleed so patient was restarted all home medication was discharged.














Labs and Pending Lab Test:


Laboratory Tests


7/17/21 23:20: 


Influenza Type A (RT-PCR) Not Detected, Influenza Type B (RT-PCR) Not Detected, 

SARS-CoV-2 RNA (RT-PCR) Not Detected


7/18/21 05:44: 


White Blood Count 9.4, Red Blood Count 4.39, Hemoglobin 13.8, Hematocrit 42, 

Mean Corpuscular Volume 95, Mean Corpuscular Hemoglobin 31, Mean Corpuscular 

Hemoglobin Concent 33, Red Cell Distribution Width 12.9, Platelet Count 253, 

Mean Platelet Volume 11.1, Immature Granulocyte % (Auto) 0, Neutrophils (%) 

(Auto) 66, Lymphocytes (%) (Auto) 19, Monocytes (%) (Auto) 9, Eosinophils (%) 

(Auto) 5, Basophils (%) (Auto) 1, Neutrophils # (Auto) 6.2, Lymphocytes # (Auto)

1.8, Monocytes # (Auto) 0.8, Eosinophils # (Auto) 0.5H, Basophils # (Auto) 0.1, 

Immature Granulocyte # (Auto) 0.0, Sodium Level 139, Potassium Level 3.6, 

Chloride Level 109H, Carbon Dioxide Level 22, Anion Gap 8, Blood Urea Nitrogen 

5L, Creatinine 0.62, Estimat Glomerular Filtration Rate > 60, BUN/Creatinine 

Ratio 8, Glucose Level 93, Calcium Level 7.7L, Phosphorus Level 2.5, Magnesium 

Level 1.8, Total Bilirubin 0.9, Direct Bilirubin 0.4H, Indirect Bilirubin 0.5, 

Aspartate Amino Transf (AST/SGOT) 18, Alanine Aminotransferase (ALT/SGPT) 12, 

Alkaline Phosphatase 71, Total Protein 5.4L, Albumin 2.7L





Microbiology


7/17/21 MRSA Screen - Final, Complete


          MRSA not isolated





Home Meds


Active


Reported


Symbicort 160-4.5 Mcg Inhaler (Budesonide/Formoterol Fumarate) 10.2 Gm 

Hfa.aer.ad 2 Puff IH DAILY


Ventolin Hfa (Albuterol Sulfate) 1 Puff Puff 2 Puff INH Q4H PRN


Hair, Skin & Nails (Multivitamin with Minerals) 1 Each Tablet 1 Each PO DAILY


Lisinopril 10 Mg Tablet 10 Mg PO 2100


Cartia Xt (Diltiazem HCl) 120 Mg Cap.er.24h 120 Mg PO BID


Digoxin 250 Mcg Tablet 125 Mcg PO TUES,THURS


     PT TAKES  OF A (250MCG) TABLET ON TUESDAYS AND THURSDAYS


Digoxin 250 Mcg Tablet 250 Mcg PO SUN,MON,WED,FRI,SAT


Eliquis (Apixaban) 2.5 Mg Tablet 2.5 Mg PO BID


Assessment/Pt Instructions


[facility] 1 week


Discharge Planning:  <30 minutes discharge planning





Discharge Instructions


Discharge Diet:  No Restrictions


Activity as Tolerated:  Yes





Discharge Physical Examination


Vital Signs





Vital Signs








  Date Time  Temp Pulse Resp B/P (MAP) Pulse Ox O2 Delivery O2 Flow Rate FiO2


 


7/18/21 17:26        


 


7/18/21 15:27     95 Room Air  


 


7/18/21 12:37 35.7 85 20     


 


7/18/21 11:50       10 


 


7/16/21 00:41        21








General Appearance:  No Apparent Distress, WD/WN, Chronically ill


Respiratory:  Wheezing (Subtle)


Allergies:  


Coded Allergies:  


     Penicillins (Verified  Allergy, Unknown, 7/15/21)


     Sulfa (Sulfonamide Antibiotics) (Verified  Allergy, Unknown, 7/15/21)


     Tetanus Vaccines and Toxoid (Verified  Allergy, Unknown, 7/15/21)


     acetaminophen (Verified  Allergy, Unknown, 7/15/21)


     cephalexin (Verified  Allergy, Unknown, 7/15/21)


     ibuprofen (Verified  Allergy, Unknown, 7/15/21)


     warfarin (Verified  Allergy, Unknown, 7/15/21)





Discharge Summary


Date of Admission


Jul 15, 2021 at 22:47


Date of Discharge


Jul 18, 2021 at 17:00


Discharge Date:  Jul 18, 2021


Admission Diagnosis





Discharge Diagnosis





(1) BRBPR (bright red blood per rectum)


Status:  Acute


(2) Afib


Status:  Chronic


Qualifiers:  


   Qualified Codes:  I48.21 - Permanent atrial fibrillation


(3) HTN (hypertension)


Status:  Chronic


Qualifiers:  


   Qualified Codes:  I10 - Essential (primary) hypertension


(4) COPD (chronic obstructive pulmonary disease)


Status:  Chronic


Qualifiers:  


   Qualified Codes:  J42 - Unspecified chronic bronchitis











JEB CASTRO DO                Jul 18, 2021 21:17

## 2021-07-18 NOTE — ENDOSCOPY DISCHARGE INSTRUCT
Endo Procedure/Findings


Findings


1.:  Gastritis


2.:  Hiatal Hernia


3.:  Internal Hemorrhoids


4.:  Other Findings (raw skin on basilia-anal area, unable to complete colonoscopy)





Discharge Instructions


-


Activity: You might feel a little sleepy until tomorrow.  This is due to the 

medicine you received to relax you.





Until tomorrow, you should:  


   NOT drive a car, operate machinery or power tools.


   NOT drink any alcoholic beverages.


   NOT make any important decisions or sign importortant papers.





Do not return to work until tomorrow, unless otherwise instructed. Resume 

previous activities tomorrow.





Diet: Start by taking liquids.  If you tolerate liquids, advance to solid food.


1.:  EGD in 3 years


2.:  Colonscopy in 3 years





Notify Physician


-


If you experience excessive bleeding, unusual abdominal pain, fever, or chest 

pain, contact your doctor immediately.





Follow-Up:


Reconcile Patient Problems


Problems:  


(1) BRBPR (bright red blood per rectum)


(2) Afib


Qualifiers:  


   Qualified Codes:  I48.21 - Permanent atrial fibrillation


(3) HTN (hypertension)


Qualifiers:  


   Qualified Codes:  I10 - Essential (primary) hypertension


(4) COPD (chronic obstructive pulmonary disease)


Qualifiers:  


   Qualified Codes:  J42 - Unspecified chronic bronchitis











АНДРЕЙ NEVAREZ DO               Jul 18, 2021 16:04